# Patient Record
Sex: FEMALE | Race: WHITE | NOT HISPANIC OR LATINO | Employment: OTHER | URBAN - METROPOLITAN AREA
[De-identification: names, ages, dates, MRNs, and addresses within clinical notes are randomized per-mention and may not be internally consistent; named-entity substitution may affect disease eponyms.]

---

## 2019-02-08 ENCOUNTER — OFFICE VISIT (OUTPATIENT)
Dept: OBGYN CLINIC | Facility: CLINIC | Age: 74
End: 2019-02-08

## 2019-02-08 VITALS — HEIGHT: 61 IN | WEIGHT: 125.2 LBS | BODY MASS INDEX: 23.64 KG/M2

## 2019-02-08 DIAGNOSIS — N81.10 PELVIC ORGAN PROLAPSE QUANTIFICATION STAGE 1 CYSTOCELE: Primary | ICD-10-CM

## 2019-02-08 PROCEDURE — 99202 OFFICE O/P NEW SF 15 MIN: CPT | Performed by: NURSE PRACTITIONER

## 2019-02-08 RX ORDER — OMEPRAZOLE 20 MG/1
20 CAPSULE, DELAYED RELEASE ORAL
COMMUNITY
Start: 2012-09-12

## 2019-02-08 RX ORDER — FOLIC ACID 1 MG/1
1 TABLET ORAL
COMMUNITY
Start: 2012-09-12

## 2019-02-08 RX ORDER — LOSARTAN POTASSIUM 50 MG/1
50 TABLET ORAL
COMMUNITY
Start: 2018-10-16

## 2019-02-08 RX ORDER — SIMVASTATIN 40 MG
40 TABLET ORAL
COMMUNITY
Start: 2018-08-07

## 2019-02-08 RX ORDER — AMOXICILLIN 500 MG
1200 CAPSULE ORAL
COMMUNITY
Start: 2012-09-12

## 2019-02-08 RX ORDER — METOPROLOL SUCCINATE 100 MG/1
100 TABLET, EXTENDED RELEASE ORAL
COMMUNITY
Start: 2018-08-14

## 2019-02-08 RX ORDER — METHOTREXATE 2.5 MG/1
10 TABLET ORAL WEEKLY
COMMUNITY
Start: 2012-09-12

## 2019-02-08 NOTE — PROGRESS NOTES
Assessment/Plan:      Diagnoses and all orders for this visit:    Pelvic organ prolapse quantification stage 1 cystocele  -     Ambulatory referral to Urogynecology; Future    Other orders  -     Calcium-Vitamin D 500-125 MG-UNIT TABS; Take 1 tablet by mouth daily  -     folic acid (FOLVITE) 1 mg tablet; Take 1 mg by mouth  -     Omega-3 Fatty Acids (FISH OIL) 1200 MG CAPS; Take 1,200 mg by mouth  -     losartan (COZAAR) 50 mg tablet; Take 50 mg by mouth  -     metoprolol succinate (TOPROL-XL) 100 mg 24 hr tablet; Take 100 mg by mouth  -     methotrexate 2 5 MG tablet; Take 10 mg by mouth once a week  -     omeprazole (PRILOSEC) 20 mg delayed release capsule; Take 20 mg by mouth  -     simvastatin (ZOCOR) 40 mg tablet; Take 40 mg by mouth      -patient referred to [address] [place] [address],[address] for options for treatment  Patient verbalized understanding and is agreeable to be evaluated  Questions answered  Patient encouraged to remain well hydrated and to avoid constipation  Written information provided    RTO as needed     Subjective:     Patient ID: Adamaris James is a 68 y o  female  HPI  here with complaints of something falling out of vagina for the past 2-3 months  States it is worse with walking, doing chores  Will resolve when sitting down are resting  Denies history of abnormal Pap smears  Has never had abdominal surgery  Denies incontinence of urine or bowel  Denies difficulty urinating or having bowel movements  Denies vaginal discharge, odor or itching  Zjxmiswbh-kt-ec-date per patient    Review of Systems   Constitutional: Negative for chills and fever  Respiratory: Negative  Cardiovascular: Negative  Gastrointestinal: Negative  Genitourinary:        "Something in vagina"   Neurological: Negative for headaches  Objective:     Physical Exam   Constitutional: She is oriented to person, place, and time  She appears well-developed and well-nourished     Cardiovascular: Normal rate, regular rhythm and normal heart sounds  Pulmonary/Chest: Effort normal and breath sounds normal    Genitourinary: There is no rash, tenderness, lesion or injury on the right labia  There is no rash, tenderness, lesion or injury on the left labia  No erythema, tenderness or bleeding in the vagina  No foreign body in the vagina  No signs of injury around the vagina  No vaginal discharge found  Genitourinary Comments: Cervix normal   Small cystocele/small rectocele noted on exam   Vaginal tissue healthy, normal mucus  Neurological: She is alert and oriented to person, place, and time  Skin: Skin is warm and dry  Psychiatric: She has a normal mood and affect   Her behavior is normal

## 2019-02-08 NOTE — PATIENT INSTRUCTIONS
Rectocele   WHAT YOU NEED TO KNOW:   What is a rectocele? A rectocele, or vaginal hernia, is a bulge of the front wall of your rectum into your vagina  What causes a rectocele? A rectocele is often caused by weak muscles and ligaments that cannot hold and support the vagina and rectum  A wall of tough tissue, called the rectovaginal septum, separates the rectum from the vagina  The rectovaginal septum may be weak and thin  This weakening allows part of the rectum to push into the vagina  Weakening may be caused by the following:  · Aging:  Aging can cause the muscles to become weak  After menopause, a woman's body makes much less estrogen  Less estrogen makes pelvic muscles weaker  · Childbirth:  Pregnancy, and trauma during labor and delivery of a baby, may cause muscles around the vagina or rectum to weaken  Forceps used in the delivery, or tears into the rectum during delivery may cause the muscles to weaken  Large babies or multiple pregnancies may make your muscles weaker  · Genetics:  You may have been born with weaker muscles in your rectum and vagina  · Obesity:  More weight than what is suggested by your healthcare provider can put extra pressure on your muscles  · Straining:  The pressure inside your rectum increases when you strain  This usually happens with constipation, forceful coughing, and lifting heavy objects  · Surgery:  Past pelvic surgeries, such as a hysterectomy, may weaken the muscles around your vagina  What are the signs and symptoms of a rectocele? · A soft bulge of tissue in your vagina that may poke out through the vaginal opening     · Constipation    · Bowel movement that leaks out from your rectum     · Low back pain that goes away when you lie down     · Pain or pressure in your vagina when you urinate or have sex     · Pressure in your rectum, or you feel that your rectum is not empty after you have a bowel movement  How is a rectocele diagnosed?   Your healthcare provider will ask you about your lifestyle, past pregnancies, and any diseases you may have had  You may also need any of the following tests:  · Manual exam:  Your healthcare provider gently puts a warmed speculum into your vagina  A speculum is a tool that opens your vagina  Then he will ask you to strain or push down  This may cause a rectocele to bulge so he can check its size and location  You may need to tighten the muscles of your pelvis as if you are trying to stop urinating  This helps your healthcare provider learn the strength of your pelvic muscles  · Defecography:  A thick paste of barium is placed into your rectum through your anus  X-rays are taken while you push out the barium as if you are having a bowel movement  The barium makes an x-ray outline of your rectum and anus  This shows the changes taking place in your rectum and muscles during a bowel movement  · MRI:  This scan uses powerful magnets and a computer to take pictures of your abdomen and pelvis  The pictures may show problems in you rectum, vagina, or bladder  You may be given dye to help the pictures show up better  Tell the healthcare provider if you have ever had an allergic reaction to contrast dye  Do not enter the MRI room with anything metal  Metal can cause serious injury  Tell the healthcare provider if you have any metal in or on your body  · Pelvic floor fluoroscopy: This is an x-ray that shows the movement of your bowels, vagina, bladder, or rectum  Pictures of these body parts are taken and shown on a monitor  Healthcare providers may give you dye to help the pictures show up better on the screen  Tell the healthcare provider if you have ever had an allergic reaction to contrast dye  · Ultrasound: This test uses sound waves to show pictures on a monitor  An ultrasound may be done to show problems with your rectum, vagina, or bladder       · Anorectal manometry:  A flexible tube is inserted through your anus and into your rectum  Pressure from the muscles around your anus and rectum is measured by sensors in the tube  · Colonic transit studies: This test measures how fast things pass through your colon  You will be asked to swallow a large pill containing tiny plastic rings that serve as markers  These markers can be seen on x-rays  Pictures of your abdomen are taken for several days and the markers are counted  This helps your healthcare provider learn how long it takes for food to pass through your intestines  How is a rectocele treated? · Biofeedback therapy:  Biofeedback uses equipment to train you to control and relax your pelvic muscles during a bowel movement  Ask your healthcare provider for more information about biofeedback  · Estrogen:  Estrogen is a hormone that may be taken as a pill, or applied as a vaginal cream  Estrogen helps keep your pelvic muscles strong and may prevent your rectocele from getting worse  · Pessary:  A pessary is a plastic or rubber ring that is placed inside your vagina  This supports the bulging areas in your vagina and rectum  · Surgery:  Surgery may be needed to move the rectum back into place  This surgery fixes the weak walls of your vagina  Your rectum will move back into place once the walls of your vagina are fixed  What are the risks of a rectocele? A pessary may cause pain or infection  You may develop irregular bleeding or cancer if you use estrogen  Your rectocele may happen again, even with treatment  Your intestines may become blocked without treatment  How can I help prevent a rectocele? · Avoid pressure in your abdomen:  Do not strain when you have a bowel movement  Do not lift heavy objects  Tell your healthcare provider if you have a severe cough  · Do Kegel exercises regularly:  Squeeze your pelvic floor muscles to help them get stronger  Ask your healthcare provider for more information about Kegel exercises       · Drink liquids as directed:  Ask your healthcare provider how much liquid to drink each day and which liquids are best for you  Liquids will help decrease constipation  · Eat more fiber:  High-fiber foods, such as fresh fruits, vegetables, and whole grains, soften bowel movements  This helps bowel movements pass more quickly through your colon  Slowly add fiber into your diet to avoid bloating, stomach pain, and gas  · Maintain a healthy weight:  Ask your healthcare provider how much you should weigh  Ask him to help you create a weight loss plan if you are overweight  Ask for help planning an exercise program  Exercise helps your bowels work better and decreases pressure inside your colon  When should I contact my healthcare provider? · Your pain does not go away, even with treatment  · Your pessary falls out  · You have heavier vaginal bleeding than usual      · You are unable to have a bowel movement  · You have questions or concerns about your condition or care  When should I seek immediate care or call 911? · You have a mass hanging out of your vagina that you cannot push back into place  · You vomit several times in a row  · Your bowel movement is bright red or black  CARE AGREEMENT:   You have the right to help plan your care  Learn about your health condition and how it may be treated  Discuss treatment options with your caregivers to decide what care you want to receive  You always have the right to refuse treatment  The above information is an  only  It is not intended as medical advice for individual conditions or treatments  Talk to your doctor, nurse or pharmacist before following any medical regimen to see if it is safe and effective for you  © 2017 [employer] Information is for End User's use only and may not be sold, redistributed or otherwise used for commercial purposes   All illustrations and images included in CareNotes® are the copyrighted property of [employer]  or [employer]  Cystocele   WHAT YOU NEED TO KNOW:   What is a cystocele? A cystocele is a condition where part of your bladder falls into your vagina because of weakened or stretched pelvic muscles  In some cases your bladder may begin to slip through your vaginal opening  What increases my risk of a cystocele? A cystocele is commonly caused by weak pelvic muscles and ligaments that hold and support the bladder  Your risk of cystocele may be increased because of the following:  · Pregnancy or childbirth: This causes increased pressure and may stretch and weaken your pelvic muscles  You may have had trauma during childbirth from forceps or your baby's head  · Aging:  Aging and decreased hormones can cause your pelvic muscles to become weaker  · Obesity:  If you weigh more than your healthcare provider recommends, it may increase your risk of a cystocele  · Straining:  Constipation, severe coughing, or lifting heavy objects may cause increased pressure inside your abdomen and cause a cystocele  · Surgery:  Pelvic surgery such as a hysterectomy can increase your risk of a cystocele  · Collagen diseases: You may have a disease, such as Marfan's syndrome and Armond-Danlos syndrome, that makes your pelvic muscles weak  What are the signs and symptoms of a cystocele? · A soft bulge or lump in your vagina    · Low back pain that is relieved when you lie down    · Pelvic pain or pressure, especially when you urinate or have sex    · Pink or red urine    · Pressure in your abdomen, or you feel that you cannot completely empty your bladder    · Difficult, painful, or frequent urination, especially at night    · Urine leaks out when you cough, sneeze, or laugh  How is a cystocele diagnosed? Your healthcare provider will ask about your health history  This includes your lifestyle, past pregnancies, and any health conditions you have   You may need one or more of the following tests:  · Pelvic exam:  Your healthcare provider gently puts a speculum in your vagina to open it and asks you to strain or bear down  This may help find the location and size of your cystocele  Your healthcare provider may also ask you to tighten the muscles of your pelvis as if you are trying to stop urinating  This helps find how strong your pelvic muscles are  · Blood and urine test:  You may need blood or urine tests to check for an infection  · Ultrasound:  Sound waves are used to show pictures of the inside of your abdomen  A small wand with lotion on it is gently moved around your abdomen  The wand may also be placed in your vagina  Pictures of your bladder, vagina, rectum, or other pelvic organs are seen on a monitor  · X-ray: This test can take pictures of your kidneys, bladder, and ureters  You may be given dye to help the pictures show up better  Tell healthcare providers if you are allergic to iodine or shellfish  You may also be allergic to the dye  You may have x-rays taken while you urinate  · MRI:  This scan uses powerful magnets and a computer to take pictures of your bladder, vagina, and pelvic area  You may be given dye to help the pictures show up better  Tell healthcare providers if you are allergic to iodine or shellfish  You may also be allergic to the dye  Do not enter the MRI room with anything metal  Metal can cause serious injury  Tell healthcare providers if you have any metal in or on your body  · Urodynamics: This test checks if the muscles of your bladder are working properly  It also measures how much urine your bladder can hold  This test can also show whether your bladder fills and empties in a normal way  · Cystoscopy:  A thin tube with a scope is inserted into your urethra and up into your bladder  Your healthcare providers will look at the inside of your bladder for stones, bleeding, tumors, or signs of infection    How is a cystocele treated? Depending on your symptoms, you may need any of the following:  · Estrogen therapy:  Estrogen may help strengthen the pelvic muscles and keep your cystocele from getting worse  This may be taken as a pill, applied as a cream, or inserted in your vagina  · Pessary or tampon:  A pessary is a plastic or rubber ring and a tampon is a plug of cotton or other absorbent material  These are placed inside the vagina to support the bulging tissues in your bladder and vagina  · Surgery: You may need surgery to lift your bladder back into place  During surgery, stitches or a mesh patch may be placed between your bladder and vagina to hold your bladder in place  What are the risks of cystocele? You may have discomfort or an infection from using a pessary  You may have bleeding or an infection from surgery  If your cystocele is not treated, you may continue to have pain or difficulty urinating  If the cystocele comes out of your vaginal opening, it becomes more difficult to treat and control your symptoms  How can a cystocele be prevented? · Do Kegel exercises regularly: These exercises can help your pelvic floor muscles get stronger  Tighten the muscles of your pelvis (the muscles you use to stop urinating)  Hold the muscles tight for 5 seconds, then relax for 5 seconds  Gradually work up to hold the muscles contracted for 10 seconds  Do at least 3 sets of 10 repetitions a day  · Avoid straining:  Do not lift heavy objects, stand for long periods of time, or strain to have a bowel movement  Prevent constipation by drinking plenty of liquids and eating foods high in fiber  Ask how much liquid you should drink every day  High fiber foods include fresh fruits, vegetables, and whole grains  · Maintain a healthy weight:  Ask your healthcare provider if you are at a healthy weight and what is the best exercise program for you  Try to exercise at least 30 minutes every day   Exercise can also help prevent constipation  When should I contact my healthcare provider? · You have a fever  · You have chills or feel weak and achy  · You have lower abdominal pain or back pain that does not go away  · You cannot urinate  · You have questions or concerns about your condition or care  When should I seek immediate care? · You have abnormal bleeding from your vagina  · You have a mass coming out of your vagina that you cannot push back in     · You have severe lower abdominal pain  · You have a bad-smelling discharge coming from your vagina  CARE AGREEMENT:   You have the right to help plan your care  Learn about your health condition and how it may be treated  Discuss treatment options with your caregivers to decide what care you want to receive  You always have the right to refuse treatment  The above information is an  only  It is not intended as medical advice for individual conditions or treatments  Talk to your doctor, nurse or pharmacist before following any medical regimen to see if it is safe and effective for you  © 2017 [employer] Information is for End User's use only and may not be sold, redistributed or otherwise used for commercial purposes  All illustrations and images included in CareNotes® are the copyrighted property of A [employer] M [employer]  or [employer]

## 2019-04-18 ENCOUNTER — OFFICE VISIT (OUTPATIENT)
Dept: OBGYN CLINIC | Facility: CLINIC | Age: 74
End: 2019-04-18

## 2019-04-18 VITALS
HEART RATE: 71 BPM | BODY MASS INDEX: 23.69 KG/M2 | DIASTOLIC BLOOD PRESSURE: 79 MMHG | SYSTOLIC BLOOD PRESSURE: 213 MMHG | WEIGHT: 125.4 LBS

## 2019-04-18 DIAGNOSIS — N95.2 VAGINAL ATROPHY: Primary | ICD-10-CM

## 2019-04-18 PROCEDURE — 57160 INSERT PESSARY/OTHER DEVICE: CPT | Performed by: OBSTETRICS & GYNECOLOGY

## 2019-04-18 PROCEDURE — 99204 OFFICE O/P NEW MOD 45 MIN: CPT | Performed by: OBSTETRICS & GYNECOLOGY

## 2019-04-18 PROCEDURE — A4562 PESSARY, NON RUBBER,ANY TYPE: HCPCS | Performed by: OBSTETRICS & GYNECOLOGY

## 2019-04-18 RX ORDER — ESTRADIOL 0.1 MG/G
CREAM VAGINAL
Qty: 42.5 G | Refills: 0 | Status: SHIPPED | OUTPATIENT
Start: 2019-04-18 | End: 2021-11-05 | Stop reason: SDUPTHER

## 2019-05-09 ENCOUNTER — OFFICE VISIT (OUTPATIENT)
Dept: OBGYN CLINIC | Facility: CLINIC | Age: 74
End: 2019-05-09

## 2019-05-09 VITALS
WEIGHT: 123.8 LBS | HEART RATE: 69 BPM | DIASTOLIC BLOOD PRESSURE: 71 MMHG | SYSTOLIC BLOOD PRESSURE: 217 MMHG | BODY MASS INDEX: 23.39 KG/M2

## 2019-05-09 DIAGNOSIS — Z46.89 PESSARY MAINTENANCE: Primary | ICD-10-CM

## 2019-05-09 PROCEDURE — 99213 OFFICE O/P EST LOW 20 MIN: CPT | Performed by: OBSTETRICS & GYNECOLOGY

## 2019-12-12 ENCOUNTER — OFFICE VISIT (OUTPATIENT)
Dept: OBGYN CLINIC | Facility: CLINIC | Age: 74
End: 2019-12-12

## 2019-12-12 VITALS
DIASTOLIC BLOOD PRESSURE: 98 MMHG | WEIGHT: 128 LBS | BODY MASS INDEX: 24.19 KG/M2 | HEART RATE: 69 BPM | SYSTOLIC BLOOD PRESSURE: 208 MMHG

## 2019-12-12 DIAGNOSIS — N81.10 CYSTOCELE WITHOUT UTERINE PROLAPSE: Primary | ICD-10-CM

## 2019-12-12 PROCEDURE — 99213 OFFICE O/P EST LOW 20 MIN: CPT | Performed by: STUDENT IN AN ORGANIZED HEALTH CARE EDUCATION/TRAINING PROGRAM

## 2019-12-12 NOTE — PROGRESS NOTES
Urogynecology - Follow-up clinic note  [facility]   [id number]    Sami-speaking: no Phone  used: no    Chief complaint: "I'm here for my pessary change"    HPI: 76 y o  Hemanth Rose presents as follow-up for pessary care, she was last seen 5/9/19 in the [facility] for pessary insertion check  She has stage 2 anterior vaginal wall prolapse  She was advised to come every 3 months for pessary cleaning/exam and continue topical estrogen  Currently she has no concerns, she reports occasional YONI once or twice a month  No vaginal bleeding, no foul smell, no UTI, no pelvic pain  She is not sexually active  Of note, her blood pressures here in the office on 4 different checks with a manual cuff were noted to be extremely elevated 180-200s/79-90s  She was instructed to go the ED emergently for acute evaluation  She states this happens frequently when she visits the doctors and in March her BP was fine "130s/80s" and she did take her 2 blood pressure medications today  However, she states she will have her   her to the ED for further evaluation immediately after this appointment  She denies blurry vision, headache, and is asymptomatic  Risk of stroke, aneurysm and other related complications reviewed with patient      Prolapse symptoms  Bulge: no, when pessary is in  Pressure: no    Rubbing on clothing: no    Stenting for urine: no  Stenting for stool: no  Pessary use: yes Type: ring  Duration: 7 month(s)  Hormonal replacement therapy: not systemic, using vaginal premarin cream    Urinary symptoms  Urgency: no    Frequency: no   Incontinence with stress (exercise, valsalva, laugh, sneeze, cough): yes, ocasionally  Incontinence with urge: no  Postural incontinence: no   Nocturia:no   Dysuria: no   Hematuria:no   Incomplete emptying: no   Slow stream:no   Hesitancy: no   Straining with urination: no    Defecatory symptoms  Constipation:no   Frequency:no  0/day  Urgency: no   Fecal Incontinence: no  Gas incontinence:  no   Loose stools:  no   Bowel regimen:  no     Medical history and medication list reviewed and no significant changes since last visit  yes     Physical exam:  Abdomen: soft, non-tender, without masses or organomegaly  Pelvic: BUS normal  Introitus normal  Erythematous but well estrogenized appearing vaginal epithelium  Extremities: No calf tenderness  Neurologic:  alert, oriented, normal speech, no focal findings or movement disorder noted Clitoral-Bulbocavernosus reflex present    Vulvovaginal atrophy:  no none  Pessary examination: no erosion or ulceration of the vaginal walls, ring pessary removed, cleaned with water, lubricant applied and reinserted without difficulty  Urethral caruncle:  no none    Stage 2 anterior vaginal wall prolapse noted (stage 1 with pessary inserted)      Assessment/Plan:  76 y  o  with stage 2 anterior vaginal wall prolapse stable and well supported with ring pessary, no complications of use, continue to use estrogen cream as directed, RTC in 3 months for pessary check, pt advised to go emergently to the ED for treatment of her acute hypertensive crisis, reviewed Kegel exercises    Follow up in 3 month(s)     Juan Crenshaw DO

## 2019-12-12 NOTE — PATIENT INSTRUCTIONS
Please make sure to follow up with an appointment in 3 months to check your pessary  Please call with any questions/concerns

## 2020-03-12 ENCOUNTER — OFFICE VISIT (OUTPATIENT)
Dept: OBGYN CLINIC | Facility: CLINIC | Age: 75
End: 2020-03-12

## 2020-03-12 VITALS — DIASTOLIC BLOOD PRESSURE: 98 MMHG | SYSTOLIC BLOOD PRESSURE: 200 MMHG | BODY MASS INDEX: 23.81 KG/M2 | WEIGHT: 126 LBS

## 2020-03-12 DIAGNOSIS — N81.10 CYSTOCELE WITHOUT UTERINE PROLAPSE: Primary | ICD-10-CM

## 2020-03-12 PROCEDURE — 99213 OFFICE O/P EST LOW 20 MIN: CPT | Performed by: STUDENT IN AN ORGANIZED HEALTH CARE EDUCATION/TRAINING PROGRAM

## 2020-03-12 NOTE — PROGRESS NOTES
Urogynecology Evaluation  Elisa Smalls   [id number]    Icelandic-speaking: no Phone  used: no    Chief complaint: Pessary check    HPI: 76 y o  [id number] presents for pessary check  She had a size #2 ring pessary placed 4/18/2019 for stage 2 anterior vaginal wall prolapse and has been coming every 3 months for pessary care  She is doing well today and reports no complaints  She denies stress or urge urinary incontinence, denies dysuria or hematuria  No vaginal bleeding, no malodorous discharge  She is not currently sexually active  She is happy with her pessary and would like to continue use  She is continuing estrogen cream use 3x/week  Of note, her BP was 200/98 today  Patient reports white coat hypertension  States she checks her BP at home every day and BP is typically 120s-140s/60s-70s  She has good care with her family medicine doctor at [facility] and is currently on losartan 100mg, amlodipine 10mg, and metoprolol XL 100mg  She currently denies neurologic symptoms but is aware to go to ED for evaluation if she develops any concerning symptoms  Medical history and medication list reviewed     Physical exam:    Abdomen: soft, non-tender, without masses or organomegaly  Pelvic: BUS normal  Introitus normal  Normal appearing vaginal epithelium, small amount of atrophy  No cystocele or rectocele visualized  Neurologic:  alert, oriented, normal speech, no focal findings or movement disorder noted     Pessary examination: no erosion or ulceration of the vaginal walls, ring pessary removed, cleansed with water, and reinserted without difficulty  Assessment:  76 y  o  with stage 2 anterior vaginal wall prolapse, currently with pessary in place    Plan:    Pessary  No complications, continue pessary use  Continue estrogen cream 3x/week  RTC in 3 month for pessary care    Jun Mas MD  OBGYN, PGY-3  3/12/2020  4:53 PM

## 2020-06-11 ENCOUNTER — OFFICE VISIT (OUTPATIENT)
Dept: OBGYN CLINIC | Facility: CLINIC | Age: 75
End: 2020-06-11

## 2020-06-11 VITALS
TEMPERATURE: 97 F | SYSTOLIC BLOOD PRESSURE: 170 MMHG | HEART RATE: 75 BPM | BODY MASS INDEX: 24.64 KG/M2 | WEIGHT: 130.4 LBS | DIASTOLIC BLOOD PRESSURE: 67 MMHG

## 2020-06-11 DIAGNOSIS — Z46.89 PESSARY MAINTENANCE: Primary | ICD-10-CM

## 2020-06-11 PROCEDURE — 99213 OFFICE O/P EST LOW 20 MIN: CPT | Performed by: STUDENT IN AN ORGANIZED HEALTH CARE EDUCATION/TRAINING PROGRAM

## 2020-10-07 ENCOUNTER — TELEPHONE (OUTPATIENT)
Dept: OBGYN CLINIC | Facility: CLINIC | Age: 75
End: 2020-10-07

## 2020-10-08 ENCOUNTER — ROUTINE PRENATAL (OUTPATIENT)
Dept: OBGYN CLINIC | Facility: CLINIC | Age: 75
End: 2020-10-08

## 2020-10-08 VITALS — TEMPERATURE: 97.2 F | WEIGHT: 130 LBS | BODY MASS INDEX: 24.56 KG/M2 | HEART RATE: 88 BPM

## 2020-10-08 DIAGNOSIS — N81.4 CYSTOCELE WITH PROLAPSE: Primary | ICD-10-CM

## 2020-10-08 DIAGNOSIS — N81.10 CYSTOCELE WITHOUT UTERINE PROLAPSE: ICD-10-CM

## 2020-10-08 PROCEDURE — 99213 OFFICE O/P EST LOW 20 MIN: CPT | Performed by: STUDENT IN AN ORGANIZED HEALTH CARE EDUCATION/TRAINING PROGRAM

## 2020-10-09 ENCOUNTER — TELEPHONE (OUTPATIENT)
Dept: OBGYN CLINIC | Facility: CLINIC | Age: 75
End: 2020-10-09

## 2020-10-12 DIAGNOSIS — N95.2 VAGINAL ATROPHY: Primary | ICD-10-CM

## 2020-10-12 RX ORDER — ESTRADIOL 0.1 MG/G
1 CREAM VAGINAL 2 TIMES WEEKLY
Qty: 42.5 G | Refills: 1 | Status: SHIPPED | OUTPATIENT
Start: 2020-10-12 | End: 2021-11-05 | Stop reason: SDUPTHER

## 2021-02-25 ENCOUNTER — OFFICE VISIT (OUTPATIENT)
Dept: OBGYN CLINIC | Facility: CLINIC | Age: 76
End: 2021-02-25

## 2021-02-25 VITALS — WEIGHT: 124 LBS | SYSTOLIC BLOOD PRESSURE: 150 MMHG | BODY MASS INDEX: 23.43 KG/M2 | DIASTOLIC BLOOD PRESSURE: 87 MMHG

## 2021-02-25 DIAGNOSIS — N81.10 CYSTOCELE WITHOUT UTERINE PROLAPSE: Primary | ICD-10-CM

## 2021-02-25 PROCEDURE — 99213 OFFICE O/P EST LOW 20 MIN: CPT | Performed by: STUDENT IN AN ORGANIZED HEALTH CARE EDUCATION/TRAINING PROGRAM

## 2021-02-25 NOTE — PROGRESS NOTES
[facility] UroGynecology Progress note   [facility] 76 y o  female MRN: [id number]  Unit/Bed#:  Encounter: [id number]    Janie Mcdowell Reports to be feeling well  She reports her pessary is working well and denies discomfort  She denies vaginal bleeding, vaginal discharge or vaginal irritation  She denies symptoms of YONI or UUI  She wishes to continue using pessary  She would like to have clinic continued maintenance  She reports regular use of premarin cream 2x weekly at night  /87   Wt 56 2 kg (124 lb)   BMI 23 43 kg/m²       No results found for: WBC, HGB, HCT, MCV, PLT    Physical Exam  Gen: AAOx3, NAD  CVS: S1S2+, RRR, no murmurs  Lungs: CTA b/l normal respiratory effort and rate  Abdomen: soft, non tender  Pelvic:    Speculum exam: No areas of excoriation or irritation  No ulceration  No granulation tissue  No vaginal discharge  noted  Pessary removed and cleaned with soap and water  Ring pessary #2 re inserted without difficulty         A/P: 76 y o     1) Anterior vaginal wall prolapse (stage 2)    - Continue with routine pessary care   - Return to clinic in 4 months for pessary recheck   - Continue 2x weekly use of vaginal premarin cream    - Call for complications or problems with pessary dislodgement       Bob Mi DO  2021  2:05 PM

## 2021-06-24 ENCOUNTER — OFFICE VISIT (OUTPATIENT)
Dept: OBGYN CLINIC | Facility: CLINIC | Age: 76
End: 2021-06-24

## 2021-06-24 VITALS
BODY MASS INDEX: 23.24 KG/M2 | HEART RATE: 73 BPM | SYSTOLIC BLOOD PRESSURE: 177 MMHG | WEIGHT: 123 LBS | DIASTOLIC BLOOD PRESSURE: 68 MMHG

## 2021-06-24 DIAGNOSIS — N81.2 INCOMPLETE UTEROVAGINAL PROLAPSE: Primary | ICD-10-CM

## 2021-06-24 PROCEDURE — 99213 OFFICE O/P EST LOW 20 MIN: CPT | Performed by: STUDENT IN AN ORGANIZED HEALTH CARE EDUCATION/TRAINING PROGRAM

## 2021-06-26 NOTE — PROGRESS NOTES
[facility] Progress note   Meet Lei 76 y o  female MRN: [id number]  Unit/Bed#:  Encounter: [id number]    Ketty Mcdowell Reports to be feeling well  She reports her pessary is working well and denies discomfort  She denies vaginal bleeding, vaginal discharge or vaginal irritation  She denies symptoms of YONI or UUI  She wishes to continue using pessary  She would like to have clinic continued maintenance  She reports regular use of premarin cream 2x weekly at night  BP (!) 177/68   Pulse 73   Wt 55 8 kg (123 lb)   BMI 23 24 kg/m²       No results found for: WBC, HGB, HCT, MCV, PLT    Physical Exam  Gen: AAOx3, NAD  CVS: S1S2+, RRR, no murmurs  Lungs: CTA b/l normal respiratory effort and rate  Abdomen: soft, non tender  Pelvic:    Speculum exam: No areas of excoriation or irritation  No ulceration  No granulation tissue  No vaginal discharge  noted  Pessary removed and cleaned with soap and water  Ring pessary #2 re inserted without difficulty         A/P: 76 y o     1) Anterior vaginal wall prolapse (stage 2)    - Continue with routine pessary care   - Return to clinic in 4 months for pessary recheck   - Continue 2x weekly use of vaginal premarin cream    - Call for complications or problems with pessary dislodgement       Darron Muse DO  2021  1:13 PM

## 2021-11-05 ENCOUNTER — OFFICE VISIT (OUTPATIENT)
Dept: OBGYN CLINIC | Facility: CLINIC | Age: 76
End: 2021-11-05

## 2021-11-05 VITALS
DIASTOLIC BLOOD PRESSURE: 70 MMHG | SYSTOLIC BLOOD PRESSURE: 172 MMHG | HEART RATE: 68 BPM | WEIGHT: 125 LBS | BODY MASS INDEX: 23.62 KG/M2

## 2021-11-05 DIAGNOSIS — N81.10 CYSTOCELE WITHOUT UTERINE PROLAPSE: ICD-10-CM

## 2021-11-05 DIAGNOSIS — M35.3 POLYMYALGIA RHEUMATICA (HCC): Primary | ICD-10-CM

## 2021-11-05 DIAGNOSIS — N95.2 VAGINAL ATROPHY: ICD-10-CM

## 2021-11-05 PROCEDURE — 99213 OFFICE O/P EST LOW 20 MIN: CPT | Performed by: STUDENT IN AN ORGANIZED HEALTH CARE EDUCATION/TRAINING PROGRAM

## 2021-11-05 RX ORDER — ESTRADIOL 0.1 MG/G
CREAM VAGINAL
Qty: 42.5 G | Refills: 0 | Status: SHIPPED | OUTPATIENT
Start: 2021-11-05

## 2021-11-05 RX ORDER — ESTRADIOL 0.1 MG/G
1 CREAM VAGINAL 2 TIMES WEEKLY
Qty: 42.5 G | Refills: 1 | Status: SHIPPED | OUTPATIENT
Start: 2021-11-08

## 2022-03-04 ENCOUNTER — OFFICE VISIT (OUTPATIENT)
Dept: OBGYN CLINIC | Facility: CLINIC | Age: 77
End: 2022-03-04

## 2022-03-04 VITALS
DIASTOLIC BLOOD PRESSURE: 74 MMHG | HEART RATE: 68 BPM | WEIGHT: 123.4 LBS | BODY MASS INDEX: 23.32 KG/M2 | SYSTOLIC BLOOD PRESSURE: 157 MMHG

## 2022-03-04 DIAGNOSIS — N81.4 CYSTOCELE WITH PROLAPSE: ICD-10-CM

## 2022-03-04 DIAGNOSIS — Z46.89 PESSARY MAINTENANCE: Primary | ICD-10-CM

## 2022-03-04 DIAGNOSIS — N81.2 INCOMPLETE UTEROVAGINAL PROLAPSE: ICD-10-CM

## 2022-03-04 PROCEDURE — 99213 OFFICE O/P EST LOW 20 MIN: CPT | Performed by: STUDENT IN AN ORGANIZED HEALTH CARE EDUCATION/TRAINING PROGRAM

## 2022-03-04 RX ORDER — ATORVASTATIN CALCIUM 40 MG/1
1 TABLET, FILM COATED ORAL EVERY EVENING
COMMUNITY
Start: 2021-10-18

## 2022-03-04 RX ORDER — METOPROLOL TARTRATE 100 MG/1
TABLET ORAL
COMMUNITY

## 2022-03-04 RX ORDER — MAGNESIUM CARB/ALUMINUM HYDROX 105-160MG
TABLET,CHEWABLE ORAL
COMMUNITY

## 2022-03-04 NOTE — PROGRESS NOTES
Follow Up Visit  Pessary Check     Subjective:  Zoila Waters is a 68 y o   female who presents for pessary check  Denies changes since last office visit  Denies bleeding, irritation, problems with urination  No new meds  Using estrogen cream as prescribed  Objective:  Vitals:    22 1019   BP: 157/74   Pulse: 68     Physical Exam  Constitutional:       General: She is not in acute distress  Appearance: She is normal weight  She is not ill-appearing or diaphoretic  HENT:      Head: Normocephalic and atraumatic  Eyes:      Conjunctiva/sclera: Conjunctivae normal       Pupils: Pupils are equal, round, and reactive to light  Cardiovascular:      Rate and Rhythm: Normal rate  Pulmonary:      Effort: Pulmonary effort is normal  No respiratory distress  Abdominal:      General: Abdomen is flat  There is no distension  Palpations: Abdomen is soft  Tenderness: There is no abdominal tenderness  Genitourinary:     Comments: Normal appearing external genitalia, vaginal mucosa, and cervix  No evidence of vaginal discharge, drainage, bleeding, erosion/ulceration  Musculoskeletal:      Cervical back: Normal range of motion  Skin:     General: Skin is warm and dry  Neurological:      General: No focal deficit present  Mental Status: She is alert and oriented to person, place, and time  Mental status is at baseline  Psychiatric:         Mood and Affect: Mood normal          Behavior: Behavior normal          Thought Content: Thought content normal          Assessment:  Zoila Waters is a 68 y o   female who presents for pessary check  Pessary cleaned  Vaginal tissue healthy, no evidence of erosion/ulceration  Pessary replaced without difficulty       Plan:   - RTC 4 months for next pessary check, sooner PRN   - Continue vaginal estrogen     Sofiya Russell MD  PGY-3, OBGYN  22

## 2022-07-01 ENCOUNTER — OFFICE VISIT (OUTPATIENT)
Dept: OBGYN CLINIC | Facility: CLINIC | Age: 77
End: 2022-07-01

## 2022-07-01 VITALS
DIASTOLIC BLOOD PRESSURE: 68 MMHG | BODY MASS INDEX: 22.96 KG/M2 | HEIGHT: 61 IN | SYSTOLIC BLOOD PRESSURE: 169 MMHG | WEIGHT: 121.6 LBS | HEART RATE: 67 BPM

## 2022-07-01 DIAGNOSIS — N95.2 VAGINAL ATROPHY: ICD-10-CM

## 2022-07-01 DIAGNOSIS — N81.4 UTEROVAGINAL PROLAPSE: ICD-10-CM

## 2022-07-01 DIAGNOSIS — N76.5 VAGINAL ULCERATION: Primary | ICD-10-CM

## 2022-07-01 PROCEDURE — 99213 OFFICE O/P EST LOW 20 MIN: CPT | Performed by: OBSTETRICS & GYNECOLOGY

## 2022-07-01 NOTE — PROGRESS NOTES
[facility] Pessary Check   Violet Mclain 68 y o  female MRN: [id number]  Unit/Bed#:  Encounter: [id number]    Alma Mcdowell Reports to be feeling well  She reports her ring #2 pessary is working well and denies discomfort  She denies vaginal bleeding, vaginal discharge or vaginal irritation  She denies symptoms of YONI or UUI  She wishes to continue using pessary  She would like to have clinic continued maintenance  She reports regular use of premarin cream 2x weekly at night  Vitals:    07/01/22 1215   BP: 169/68   Pulse: 67       Physical Exam  Gen: AAOx3, NAD  Abdomen: soft, non tender  Pelvic:  Speculum exam: Pessary removed with minimal discharge  Medium speculum revealed 3 cm transverse x 1 cm deep superficial area of excoriation and granulation tissue on posterior fornix behind cervix  No bleeding  Pessary removed and cleaned with soap and water  Given back to patient in a bag  A/P: 68 y o    with pelvic organ prolapse here for pessary maintenance       1) Anterior vaginal wall prolapse (stage 2)    - Continue with routine pessary care   - Return to clinic in 2-4 weeks for pessary reinsertion pending healed vaginal wall ulcer   - Continue daily use of vaginal premarin cream    - Call for complications or problems with pessary dislodgement       Jodee Bowie MD  [facility] [address] [place] [address] for Female Pelvic Medicine

## 2022-08-12 ENCOUNTER — OFFICE VISIT (OUTPATIENT)
Dept: OBGYN CLINIC | Facility: CLINIC | Age: 77
End: 2022-08-12

## 2022-08-12 VITALS
SYSTOLIC BLOOD PRESSURE: 175 MMHG | BODY MASS INDEX: 22.67 KG/M2 | HEART RATE: 68 BPM | WEIGHT: 120 LBS | DIASTOLIC BLOOD PRESSURE: 65 MMHG

## 2022-08-12 DIAGNOSIS — N95.2 VAGINAL ATROPHY: ICD-10-CM

## 2022-08-12 DIAGNOSIS — N81.4 CYSTOCELE WITH PROLAPSE: Primary | ICD-10-CM

## 2022-08-12 PROCEDURE — 99213 OFFICE O/P EST LOW 20 MIN: CPT | Performed by: OBSTETRICS & GYNECOLOGY

## 2022-08-12 RX ORDER — CANDESARTAN 32 MG/1
32 TABLET ORAL DAILY
COMMUNITY
Start: 2022-01-03 | End: 2023-01-03

## 2022-08-12 RX ORDER — AMLODIPINE BESYLATE 10 MG/1
TABLET ORAL
COMMUNITY
Start: 2022-06-30

## 2022-08-12 RX ORDER — HYDROCHLOROTHIAZIDE 25 MG/1
25 TABLET ORAL DAILY
COMMUNITY
Start: 2022-04-19 | End: 2023-04-19

## 2022-08-12 NOTE — PROGRESS NOTES
[facility] Pessary Check   Rene Hirsch 68 y o  female MRN: [id number]  Unit/Bed#:  Encounter: [id number]    Chilango Mcdowell has no complaints today  She denies any vaginal bleeding or pain  She has had some increased urinary frequency without the pessary in place  She reports no changes in her health, although she does have elevated BP today  She recently saw her PCP at [facility] for her hypertension and she is now on 4 medications: HCTZ, Amlodipine, Topril and Candesartan  She uses a ring #2 pessary that has been out since 7/1/22 to allow for healing of a vaginal ulcer  She reports using Premarin daily  Vitals:    08/12/22 0958   BP: (!) 175/65   Pulse: 68       Physical Exam  Gen: AAOx3, NAD  Abdomen: soft, non tender  Pelvic:  Speculum exam: Pessary removed with minimal discharge  Superficial vaginal ulcer in the posterior fornix, 3 cm transverse x 0 5 cm but much improved from prior exam, no bleeding  Pessary replaced as patient given options and she strongly desired placement  A/P: 68 y o    with pelvic organ prolapse here for pessary maintenance  Anterior vaginal wall prolapse (stage 2)    - Continue with routine pessary care   - Pessary replaced today      - Sent vaginal premarin cream refills and instructed the patient to space out usage to three times per week    - Call for complications or problems with pessary dislodgement     Izabela Bailon MD  [facility] [address] [place] [address] for Female Pelvic Medicine

## 2022-10-11 ENCOUNTER — OFFICE VISIT (OUTPATIENT)
Dept: OBGYN CLINIC | Facility: CLINIC | Age: 77
End: 2022-10-11

## 2022-10-11 VITALS
DIASTOLIC BLOOD PRESSURE: 65 MMHG | SYSTOLIC BLOOD PRESSURE: 173 MMHG | HEART RATE: 66 BPM | WEIGHT: 121 LBS | BODY MASS INDEX: 22.86 KG/M2

## 2022-10-11 DIAGNOSIS — N81.10 CYSTOCELE WITHOUT UTERINE PROLAPSE: Primary | ICD-10-CM

## 2022-10-11 DIAGNOSIS — N95.2 VAGINAL ATROPHY: ICD-10-CM

## 2022-10-11 PROCEDURE — 99213 OFFICE O/P EST LOW 20 MIN: CPT | Performed by: OBSTETRICS & GYNECOLOGY

## 2022-10-11 NOTE — PROGRESS NOTES
UroGynecology Pessary Check   Meagan Maguire 68 y o  female MRN: [id number]    Assessment:    Diagnoses and all orders for this visit:    Cystocele without uterine prolapse    Vaginal atrophy    - Pessary cleansed and replaced without difficulty  Plan:   Return to office in 3 months, or sooner PRN  Subjective     Meagan Maguire is a 68 y o  female with anterior vaginal wall/pelvic organ prolapse here for a pessary maintenance visit  Current pessary: ring; Size 2     She reports feeling well today  After her last change, her pessary fell out when she went home  She was able to replace it without difficulty or pain, but was worried at the time  Discussed utilizing sterile gel to aid in replacement if it happens again, and said that her strategy was successful, as she did not have pain, bleeding or prolapse since her own replacement  Patient does not have any other complaints  Vaginal bleeding: none  Bladder complaints: no problems with bladder  Leukorrhea: none  Pelvic Pain/Discomfort: none    No dysuria, hematuria, constipation, diarrhea, blood in stool    At her last assessment, she was noted to have a healing vaginal ulcer in the posterior fornix, 3 cm transverse x 0 5cm        Patient Active Problem List   Diagnosis   • Cystocele without uterine prolapse   • Polymyalgia rheumatica ([facility] [address] )   • Vaginal atrophy       Obstetric History  OB History    Para Term  AB Living   3 3 3     3   SAB IAB Ectopic Multiple Live Births                  # Outcome Date GA Lbr Gustavo/2nd Weight Sex Delivery Anes PTL Lv   3 Term            2 Term            1 Term                Past Medical/Surgical/Family/Social History  The following portions of the patient's history were reviewed and updated as appropriate: allergies, current medications, past family history, past medical history, past social history, past surgical history and problem list       Oxycodone-acetaminophen    Current Outpatient Medications: •  amLODIPine (NORVASC) 10 mg tablet, , Disp: , Rfl:   •  atorvastatin (LIPITOR) 40 mg tablet, Take 1 tablet by mouth every evening, Disp: , Rfl:   •  Calcium-Vitamin D 500-125 MG-UNIT TABS, Take 1 tablet by mouth daily, Disp: , Rfl:   •  candesartan (ATACAND) 32 MG tablet, Take 32 mg by mouth daily, Disp: , Rfl:   •  conjugated estrogens (PREMARIN) vaginal cream, Insert 0 5 g into the vagina 3 (three) times a week, Disp: 30 g, Rfl: 3  •  estradiol (ESTRACE) 0 1 mg/g vaginal cream, Insert 1 g into the vagina 2 (two) times a week, Disp: 42 5 g, Rfl: 1  •  folic acid (FOLVITE) 1 mg tablet, Take 1 mg by mouth, Disp: , Rfl:   •  Glucosamine-Chondroitin 750-600 MG TABS, Glucosamine-Chondroitin TABS   Refills: 0     Active, Disp: , Rfl:   •  hydrochlorothiazide (HYDRODIURIL) 25 mg tablet, Take 25 mg by mouth daily, Disp: , Rfl:   •  losartan (COZAAR) 50 mg tablet, Take 50 mg by mouth, Disp: , Rfl:   •  methotrexate 2 5 MG tablet, Take 10 mg by mouth once a week, Disp: , Rfl:   •  metoprolol succinate (TOPROL-XL) 100 mg 24 hr tablet, Take 100 mg by mouth, Disp: , Rfl:   •  metoprolol tartrate (LOPRESSOR) 100 mg tablet, TAKE TABLET Once, Disp: , Rfl:   •  Multiple Vitamins-Minerals (MULTIVITAL PO), Multivitamins Oral Capsule   Refills: 0     Active, Disp: , Rfl:   •  Omega-3 Fatty Acids (FISH OIL) 1200 MG CAPS, Take 1,200 mg by mouth, Disp: , Rfl:   •  omeprazole (PriLOSEC) 20 mg delayed release capsule, Take 20 mg by mouth, Disp: , Rfl:   •  simvastatin (ZOCOR) 40 mg tablet, Take 40 mg by mouth, Disp: , Rfl:       Review of Systems  Constitutional: Negative for fevers, chills, headaches, vision changes  Respiratory: Negative for shortness of breath, cough  Cardiovascular: Negative for chest pain, palpitations  Gastrointestinal: Negative for nausea, vomiting, diarrhea, constipation  :  Negative for dysuria, hematuria  EXTR:  Negative for rash       Objective     BP (!) 173/65   Pulse 66   Wt 54 9 kg (121 lb)   BMI 22 86 kg/m²     Physical Exam  Gen: Alert, pleasant  Abdomen: soft, non tender  Pelvic:  Speculum exam: Pessary removed with scant discharge  Small area of erythema noted at 3 o'clock (3mm), distal from cervix  No defined borders, no bleeding  Previously seen superficial vaginal ulcer in posterior fornix now resolved      Current Pessary Type: ring  Number: 2    Washed with chlorhexidine, betadine, and soap with water, then replaced        Ken Sams, PGY3

## 2023-01-10 ENCOUNTER — OFFICE VISIT (OUTPATIENT)
Dept: OBGYN CLINIC | Facility: CLINIC | Age: 78
End: 2023-01-10

## 2023-01-10 VITALS — BODY MASS INDEX: 22.52 KG/M2 | DIASTOLIC BLOOD PRESSURE: 80 MMHG | SYSTOLIC BLOOD PRESSURE: 160 MMHG | WEIGHT: 119.2 LBS

## 2023-01-10 DIAGNOSIS — N81.10 CYSTOCELE WITHOUT UTERINE PROLAPSE: Primary | ICD-10-CM

## 2023-01-12 NOTE — PROGRESS NOTES
Pessary    Date/Time: 1/10/2023 9:40 AM  Performed by: Jian Mi MD  Authorized by: Jian Mi MD   Universal Protocol:  Consent: Verbal consent obtained  Written consent obtained  Risks and benefits: risks, benefits and alternatives were discussed  Consent given by: patient  Time out: Immediately prior to procedure a "time out" was called to verify the correct patient, procedure, equipment, support staff and site/side marked as required  Patient understanding: patient states understanding of the procedure being performed  Patient consent: the patient's understanding of the procedure matches consent given  Patient identity confirmed: verbally with patient      Indication:     Indication for pessary: uterine prolapse and cystocele    Pre-procedure:     Pessary procedure type:  Cleaning/check  Problems:     Pessary complications: none    Procedure:     Pessary type:  Ring w/ support    Patient tolerance of procedure:   Tolerated well, no immediate complications  Comments:     Procedure comments:  Vagina inspected and found to be intact   No sign of erosion or infection     Went over options for surgery   She will consider it in the future   Her daughter is also thinking about surgery

## 2023-05-18 ENCOUNTER — OFFICE VISIT (OUTPATIENT)
Dept: OBGYN CLINIC | Facility: CLINIC | Age: 78
End: 2023-05-18

## 2023-05-18 VITALS
WEIGHT: 119.6 LBS | SYSTOLIC BLOOD PRESSURE: 164 MMHG | HEIGHT: 62 IN | HEART RATE: 70 BPM | BODY MASS INDEX: 22.01 KG/M2 | DIASTOLIC BLOOD PRESSURE: 73 MMHG

## 2023-05-18 DIAGNOSIS — M35.3 POLYMYALGIA RHEUMATICA (HCC): ICD-10-CM

## 2023-05-18 DIAGNOSIS — N81.4 UTERINE PROLAPSE: Primary | ICD-10-CM

## 2023-05-18 NOTE — PROGRESS NOTES
"[address] [place] [address]  [place]  [address] [address]  Phone#  [phone]  Fax#  [phone]  Bianca Solis       PROBLEM VISIT      Subjective     Bianca Solis is a 68 y o  female here for pessary maintanence    Patient with current history of cystocele with uterine prolapse stage 2 in management with a ring pessary size 2 in place, last visit for cleaning was last 1/10/23  Patient is currently using estrogen cream 3 x per week    Personal health questionnaire reviewed: yes  Obstetric History  OB History    Para Term  AB Living   3 3 3     3   SAB IAB Ectopic Multiple Live Births                  # Outcome Date GA Lbr Gustavo/2nd Weight Sex Delivery Anes PTL Lv   3 Term            2 Term            1 Term                  The following portions of the patient's history were reviewed and updated as appropriate: allergies, current medications, past medical history, past social history and problem list     Review of Systems  Pertinent items are noted in HPI        Objective     /73   Pulse 70   Ht 5' 1 5\" (1 562 m)   Wt 54 3 kg (119 lb 9 6 oz)   BMI 22 23 kg/m²     General Appearance:    Alert, cooperative, no distress, appears stated age   Head:    Normocephalic, without obvious abnormality, atraumatic   Eyes:    PERRL, conjunctiva/corneas clear, EOM's intact, fundi     benign, both eyes   Ears:    Normal TM's and external ear canals, both ears   Nose:   Nares normal, septum midline, mucosa normal, no drainage    or sinus tenderness   Throat:   Lips, mucosa, and tongue normal; teeth and gums normal   Neck:   Supple, symmetrical, trachea midline, no adenopathy;     thyroid:  no enlargement/tenderness/nodules; no carotid    bruit or JVD   Back:     Symmetric, no curvature, ROM normal, no CVA tenderness   Lungs:     Clear to auscultation bilaterally, respirations unlabored   Chest Wall:    No tenderness or deformity    Heart:    Regular rate and rhythm, S1 " and S2 normal, no murmur, rub   or gallop   Breast Exam:    No tenderness, masses, or nipple abnormality   Abdomen:     Soft, non-tender, bowel sounds active all four quadrants,     no masses, no organomegaly   Genitalia:    Ring pessary size 2 in place, it was removed without complication, speculum examination with 2 cm area of abration/laceration in the left vaginal mucosa  No active bleeding present   Rectal:    Normal tone, no masses or tenderness; guaiac negative stool   Extremities:   Extremities normal, atraumatic, no cyanosis or edema   Pulses:   2+ and symmetric all extremities   Skin:   Skin color, texture, turgor normal, no rashes or lesions   Lymph nodes:   Cervical, supraclavicular, and axillary nodes normal   Neurologic:   CNII-XII intact, normal strength, sensation and reflexes     throughout         Assessment/Plan    Cystocele with uterine prolapse stage 2   · In management with a ring pessary size 2 in place  · Last visit for cleaning was last 1/10/23  Pessary maintenance without complications  · Patient is currently using estrogen cream 3 x per week  · Speculum examination with 2 cm area of abration/laceration in the left vaginal mucosa  No active bleeding present  · Pessary An discussed with patient, pessary was cleaned and handed to patient , follow up in 2 months to review mucosa healing discussed  Patient instructed to bring her pessary with her at that time  · Patient is interested in surgical definitive management, patient has been referred to Urogyn Dr Rafaela Kennedy    · All questions answered to her satisfaction      D/w Dr Sebastian Lozoya MD

## 2023-07-18 ENCOUNTER — OFFICE VISIT (OUTPATIENT)
Dept: OBGYN CLINIC | Facility: CLINIC | Age: 78
End: 2023-07-18

## 2023-07-18 VITALS
BODY MASS INDEX: 21.37 KG/M2 | WEIGHT: 113.2 LBS | HEART RATE: 69 BPM | HEIGHT: 61 IN | DIASTOLIC BLOOD PRESSURE: 67 MMHG | SYSTOLIC BLOOD PRESSURE: 139 MMHG

## 2023-07-18 DIAGNOSIS — N81.10 CYSTOCELE WITHOUT UTERINE PROLAPSE: ICD-10-CM

## 2023-07-18 DIAGNOSIS — Z46.89 PESSARY MAINTENANCE: Primary | ICD-10-CM

## 2023-07-18 PROCEDURE — 99213 OFFICE O/P EST LOW 20 MIN: CPT | Performed by: OBSTETRICS & GYNECOLOGY

## 2023-07-18 NOTE — PROGRESS NOTES
Assessment/Plan:     No problem-specific Assessment & Plan notes found for this encounter. Diagnoses and all orders for this visit:    Pessary maintenance    Cystocele without uterine prolapse    Can keep pessary in place. No need to return here as she plans on surgery with Dr. Abe Dominguez. Subjective:      Patient ID: Roni Guzman is a 68 y.o. female who presents for pessary maintenance. She has a # 2 ring with support from which she was taking a holiday from due to vaginal ulceration. She is doing well today but happy to have the pessary replaced. She plans on definitive surgery and has an appt with Dr. Abe Dominguez in September. HPI    The following portions of the patient's history were reviewed and updated as appropriate: allergies, current medications, past family history, past medical history, past social history, past surgical history and problem list.    Review of Systems      Objective:      /67   Pulse 69   Ht 5' 1" (1.549 m)   Wt 51.3 kg (113 lb 3.2 oz)   BMI 21.39 kg/m²          Physical Exam  Vitals and nursing note reviewed. Exam conducted with a chaperone present. Constitutional:       Appearance: Normal appearance. Pulmonary:      Effort: Pulmonary effort is normal.   Genitourinary:     Labia:         Right: No rash, tenderness, lesion or injury. Left: No rash, tenderness, lesion or injury. Comments: Atrophic vagina  Stage 2 cystocele  Neurological:      General: No focal deficit present. Mental Status: She is alert and oriented to person, place, and time.    Psychiatric:         Mood and Affect: Mood normal.         Behavior: Behavior normal.

## 2023-11-01 ENCOUNTER — ANESTHESIA EVENT (OUTPATIENT)
Dept: PERIOP | Facility: HOSPITAL | Age: 78
End: 2023-11-01
Payer: COMMERCIAL

## 2023-11-07 NOTE — DISCHARGE INSTRUCTIONS
Apical Vaginal Repair (EnPlace System)    WHAT YOU NEED TO KNOW:   An apical vaginal repair is a procedure to lift the cervix, vagina  and surrounding structures to the normal anatomical position. This can help prevent you from having a bulge sensation in the vagina. The procedure is also called an EnPlace procedure. **** VAGINAL PACKING WAS PLACED IN THE VAGINA- This packing will need to be removed at home on POD #3 ****      DISCHARGE INSTRUCTIONS:   Medicines:   Pain medicine: You may need medicine to take away or decrease pain. Learn how to take your medicine. Ask what medicine and how much you should take. Be sure you know how, when, and how often to take it. Do not wait until the pain is severe before you take your medicine. Tell caregivers if your pain does not decrease. Pain medicine can make you dizzy or sleepy. Prevent falls by calling someone when you get out of bed or if you need help. Antibiotics: This medicine is given to fight or prevent an infection caused by bacteria. Always take your antibiotics exactly as ordered by your healthcare provider. Do not stop taking your medicine unless directed by your healthcare provider. Never save antibiotics or take leftover antibiotics that were given to you for another illness. Take your medicine as directed. Contact your healthcare provider if you think your medicine is not helping or if you have side effects. Tell him or her if you are allergic to any medicine. Keep a list of the medicines, vitamins, and herbs you take. Include the amounts, and when and why you take them. Bring the list or the pill bottles to follow-up visits. Carry your medicine list with you in case of an emergency. Follow up with your healthcare provider as directed:  Write down your questions so you remember to ask them during your visits. Self-care:   Vaginal packing: Vaginal packing was placed into the vagina.  You will remove this packing on POD #3 early in the morning. (This is the 3rd day after your surgery). The office will call to remind you to remove this packing. Sex:  Do not have sex until your healthcare provider says it is okay. Kegel exercises: To do kegel exercises, squeeze your pelvic floor muscles for 5 to 10 seconds, then release. Regular kegel exercises will help your pelvic floor muscles become stronger. This will help prevent you from leaking urine. Ask your healthcare provider when to start these exercises and how often to do them. Sanitary pad:  Change your sanitary pad regularly. Keep track of how often you change the pad. Hadley catheter:  Keep the bag below your waist. This will help prevent infection and other problems caused by urine flowing back into your bladder. Do not pull on the catheter because this can cause pain and bleeding, and the catheter could come out. Keep the catheter tubing free of kinks so your urine will flow into the bag. Your healthcare provider will remove the catheter as soon as possible, to help prevent infection. Wound care:  When you are allowed to bathe or shower, carefully wash your vaginal area with soap and water. Do not put pressure on your abdomen: This will help prevent damage to your surgery area. Do not strain, lift heavy objects, or stand for a very long time. Do not perform strenuous exercises, such as running and weight lifting. Activity:  You may need to start walking within a few days after your procedure. Ask your healthcare provider when to start and how long you should walk. Ask about any other exercises that may be right for you. Support socks: You may need to wear support socks. These are tight socks that help increase the circulation in your legs until you are more active. This helps prevent blood clots. Contact your healthcare provider if:   You soak a sanitary pad with blood every hour for 4 hours.     You have vaginal pain that does not go away even after you take pain medicine. You have pus or a foul-smelling discharge from your genital area. You see blood in your urine. You have pain during sex. You have a fever, chills, a cough, or feel weak and achy. You have nausea and vomiting. You have questions or concerns about your condition or care. Seek care immediately or call 911 if: You feel something is bulging out into your vagina or rectum and not going back in. You cannot urinate. Your arm or leg feels warm, tender, and painful. It may look swollen and red. You suddenly feel lightheaded and short of breath. You have chest pain. You may have more pain when you take a deep breath or cough. You may cough up blood. © 2017 [facility] Information is for End User's use only and may not be sold, redistributed or otherwise used for commercial purposes. All illustrations and images included in CareNotes® are the copyrighted property of [employer] or [employer]. The above information is an  only. It is not intended as medical advice for individual conditions or treatments. Talk to your doctor, nurse or pharmacist before following any medical regimen to see if it is safe and effective for you. Urethral Sling Procedure   WHAT YOU NEED TO KNOW:   A bladder sling procedure is surgery to treat urinary incontinence in women. The sling acts as a hammock to keep your urethra in place and hold it closed when your bladder is full. You may have vaginal bleeding or discharge for up to a week after your surgery. Use sanitary pads. Do not use tampons. You may have some pelvic discomfort or trouble urinating. DISCHARGE INSTRUCTIONS:   Call 911 for any of the following: You have sudden trouble breathing. Seek care immediately if:   Your bleeding gets worse. You have yellow or foul smelling discharge from your vagina. You cannot urinate, or you are urinating less than what is normal for you.     You feel confused. Contact your healthcare provider if:   You have a fever. You do not feel like you are able to empty your bladder completely when you urinate. You feel the need to urinate very suddenly. You have burning or stinging when you urinate. You have blood in your urine. Your skin is itchy, swollen, or you have a rash. You have questions or concerns about your condition or care. Medicines:   Prescription pain medicine  may be given. Ask your how to take this medicine safely. Take your medicine as directed. Contact your healthcare provider if you think your medicine is not helping or if you have side effects. Tell him or her if you are allergic to any medicine. Keep a list of the medicines, vitamins, and herbs you take. Include the amounts, and when and why you take them. Bring the list or the pill bottles to follow-up visits. Carry your medicine list with you in case of an emergency. Self-catheterization:  You may need to put a catheter into your bladder after you urinate to empty any remaining urine. A catheter is a small rubber tube used to drain urine. Healthcare providers will teach you how to put the catheter in safely. This may be needed until you are completely emptying your bladder. Hadley catheter: You may have a Hadley catheter for a short period of time. The Hadley is a tube put into your bladder to drain urine into a bag. Keep the bag below your waist. This will prevent urine from flowing back into your bladder and causing an infection or other problems. Also, keep the tube free of kinks so the urine will drain properly. Do not pull on the catheter. This can cause pain and bleeding, and may cause the catheter to come out. Activity:  Do not lift heavy objects for 6 weeks after your procedure. Do not have intercourse for 4 to 6 weeks. Do not use a tampon for 4 weeks. Ask your healthcare provider when you can return to work or your usual activities.   Do pelvic muscle exercises: These are also called Kegel exercises. These exercises help strengthen your pelvic muscles and help prevent urine leakage. Tighten the muscles of your pelvis and hold them tight for 5 seconds, then relax for 5 seconds. Gradually work up to tightening them for 10 seconds and relaxing for 10 seconds. Do this 3 times each day. Keep a record:  Keep a record of when you urinate and if you leak any urine. Write down what you were doing when you leaked urine, such as coughing or sneezing. Bring the log to your follow-up visits. Prevent constipation:  Drink liquids as directed. You may need to drink more water than usual to soften your bowel movements. Eat a variety of healthy foods, especially fruit and foods high in fiber. You may need to use an over-the-counter bowel movement softener. Follow up with your healthcare provider as directed: You may need a test to check how much urine remains in your bladder after you urinate. This will help show how the sling is working. Write down your questions so you remember to ask them during your visits. © 2017 [address] [place] [address] [employer] Information is for End User's use only and may not be sold, redistributed or otherwise used for commercial purposes. All illustrations and images included in CareNotes® are the copyrighted property of [employer] or [employer]. The above information is an  only. It is not intended as medical advice for individual conditions or treatments. Talk to your doctor, nurse or pharmacist before following any medical regimen to see if it is safe and effective for you.

## 2023-11-09 NOTE — PRE-PROCEDURE INSTRUCTIONS
Pre-Surgery Instructions:   Medication Instructions    amLODIPine (NORVASC) 10 mg tablet Take day of surgery. atorvastatin (LIPITOR) 40 mg tablet Take night before surgery    Calcium-Vitamin D 500-125 MG-UNIT TABS Stop taking 7 days prior to surgery. candesartan (ATACAND) 32 MG tablet Hold day of surgery. conjugated estrogens (PREMARIN) vaginal cream Stop taking 7 days prior to surgery. Per Dr. Erasmo Quintana    folic acid (FOLVITE) 1 mg tablet Stop taking 7 days prior to surgery. Glucosamine-Chondroitin 750-600 MG TABS Stop taking 7 days prior to surgery. hydrochlorothiazide (HYDRODIURIL) 25 mg tablet Hold day of surgery. methotrexate 2.5 MG tablet Takes weekly on Tuesdays- will take as prescribed    metoprolol succinate (TOPROL-XL) 100 mg 24 hr tablet Take day of surgery. Multiple Vitamins-Minerals (MULTIVITAL PO) Stop taking 7 days prior to surgery. Omega-3 Fatty Acids (FISH OIL) 1200 MG CAPS Stop taking 7 days prior to surgery. Medication instructions for day surgery reviewed. Please use only a sip of water to take your instructed medications. Avoid all over the counter vitamins, supplements and NSAIDS for one week prior to surgery per anesthesia guidelines. Tylenol is ok to take as needed. You will receive a call one business day prior to surgery with an arrival time and hospital directions. If your surgery is scheduled on a Monday, the hospital will be calling you on the Friday prior to your surgery. If you have not heard from anyone by 8pm, please call the hospital supervisor through the hospital  at [phone]. Laura Martinez [phone]). Do not eat or drink anything after midnight the night before your surgery, including candy, mints, lifesavers, or chewing gum. Do not drink alcohol 24hrs before your surgery. Try not to smoke at least 24hrs before your surgery.        Follow the pre surgery showering instructions as listed in the [facility] Surgical Experience Booklet” or otherwise provided by your surgeon's office. Do not use a blade to shave the surgical area 1 week before surgery. It is okay to use a clean electric clippers up to 24 hours before surgery. Do not apply any lotions, creams, including makeup, cologne, deodorant, or perfumes after showering on the day of your surgery. Do not use dry shampoo, hair spray, hair gel, or any type of hair products. No contact lenses, eye make-up, or artificial eyelashes. Remove nail polish, including gel polish, and any artificial, gel, or acrylic nails if possible. Remove all jewelry including rings and body piercing jewelry. Wear causal clothing that is easy to take on and off. Consider your type of surgery. Keep any valuables, jewelry, piercings at home. Please bring any specially ordered equipment (sling, braces) if indicated. Arrange for a responsible person to drive you to and from the hospital on the day of your surgery. Visitor Guidelines discussed. Call the surgeon's office with any new illnesses, exposures, or additional questions prior to surgery. Please reference your [facility] Surgical Experience Booklet” for additional information to prepare for your upcoming surgery.

## 2023-11-13 ENCOUNTER — ANESTHESIA (OUTPATIENT)
Dept: PERIOP | Facility: HOSPITAL | Age: 78
End: 2023-11-13
Payer: COMMERCIAL

## 2023-11-13 ENCOUNTER — HOSPITAL ENCOUNTER (OUTPATIENT)
Facility: HOSPITAL | Age: 78
Setting detail: OUTPATIENT SURGERY
Discharge: HOME | End: 2023-11-13
Attending: OBSTETRICS & GYNECOLOGY | Admitting: OBSTETRICS & GYNECOLOGY
Payer: COMMERCIAL

## 2023-11-13 VITALS
OXYGEN SATURATION: 94 % | TEMPERATURE: 96.8 F | DIASTOLIC BLOOD PRESSURE: 69 MMHG | HEART RATE: 67 BPM | BODY MASS INDEX: 20.95 KG/M2 | WEIGHT: 110.89 LBS | RESPIRATION RATE: 16 BRPM | SYSTOLIC BLOOD PRESSURE: 156 MMHG

## 2023-11-13 PROBLEM — Z97.2 WEARS PARTIAL DENTURES: Status: ACTIVE | Noted: 2023-11-13

## 2023-11-13 PROBLEM — I10 HYPERTENSION: Status: ACTIVE | Noted: 2023-11-13

## 2023-11-13 PROBLEM — N81.11 CYSTOCELE, MIDLINE: Status: ACTIVE | Noted: 2019-12-12

## 2023-11-13 PROBLEM — N36.41 HYPERMOBILITY OF URETHRA: Status: ACTIVE | Noted: 2023-11-13

## 2023-11-13 PROBLEM — N81.89 OTHER FEMALE GENITAL PROLAPSE: Status: ACTIVE | Noted: 2023-11-13

## 2023-11-13 PROBLEM — N81.6 RECTOCELE: Status: ACTIVE | Noted: 2023-11-13

## 2023-11-13 PROBLEM — E78.5 HYPERLIPIDEMIA: Status: ACTIVE | Noted: 2023-11-13

## 2023-11-13 PROBLEM — N81.2 INCOMPLETE UTEROVAGINAL PROLAPSE: Status: ACTIVE | Noted: 2023-11-13

## 2023-11-13 PROCEDURE — 51798 US URINE CAPACITY MEASURE: CPT | Performed by: OBSTETRICS & GYNECOLOGY

## 2023-11-13 PROCEDURE — C1771 REP DEV, URINARY, W/SLING: HCPCS | Performed by: OBSTETRICS & GYNECOLOGY

## 2023-11-13 DEVICE — SINGLE INCISION SLING SYSTEM
Type: IMPLANTABLE DEVICE | Site: VAGINA | Status: FUNCTIONAL
Brand: ALTIS

## 2023-11-13 RX ORDER — FENTANYL CITRATE/PF 50 MCG/ML
25 SYRINGE (ML) INJECTION
Status: DISCONTINUED | OUTPATIENT
Start: 2023-11-13 | End: 2023-11-13 | Stop reason: HOSPADM

## 2023-11-13 RX ORDER — KETAMINE HCL IN NACL, ISO-OSM 100MG/10ML
SYRINGE (ML) INJECTION AS NEEDED
Status: DISCONTINUED | OUTPATIENT
Start: 2023-11-13 | End: 2023-11-13

## 2023-11-13 RX ORDER — SODIUM CHLORIDE, SODIUM LACTATE, POTASSIUM CHLORIDE, CALCIUM CHLORIDE 600; 310; 30; 20 MG/100ML; MG/100ML; MG/100ML; MG/100ML
125 INJECTION, SOLUTION INTRAVENOUS CONTINUOUS
Status: DISCONTINUED | OUTPATIENT
Start: 2023-11-13 | End: 2023-11-13 | Stop reason: HOSPADM

## 2023-11-13 RX ORDER — ACETAMINOPHEN 325 MG/1
975 TABLET ORAL ONCE
Status: COMPLETED | OUTPATIENT
Start: 2023-11-13 | End: 2023-11-13

## 2023-11-13 RX ORDER — ONDANSETRON 2 MG/ML
INJECTION INTRAMUSCULAR; INTRAVENOUS AS NEEDED
Status: DISCONTINUED | OUTPATIENT
Start: 2023-11-13 | End: 2023-11-13

## 2023-11-13 RX ORDER — FENTANYL CITRATE 50 UG/ML
INJECTION, SOLUTION INTRAMUSCULAR; INTRAVENOUS AS NEEDED
Status: DISCONTINUED | OUTPATIENT
Start: 2023-11-13 | End: 2023-11-13

## 2023-11-13 RX ORDER — LIDOCAINE HYDROCHLORIDE 20 MG/ML
INJECTION, SOLUTION EPIDURAL; INFILTRATION; INTRACAUDAL; PERINEURAL AS NEEDED
Status: DISCONTINUED | OUTPATIENT
Start: 2023-11-13 | End: 2023-11-13

## 2023-11-13 RX ORDER — ACETAMINOPHEN 325 MG/1
975 TABLET ORAL EVERY 6 HOURS PRN
Status: DISCONTINUED | OUTPATIENT
Start: 2023-11-13 | End: 2023-11-13 | Stop reason: HOSPADM

## 2023-11-13 RX ORDER — EPHEDRINE SULFATE 50 MG/ML
INJECTION INTRAVENOUS AS NEEDED
Status: DISCONTINUED | OUTPATIENT
Start: 2023-11-13 | End: 2023-11-13

## 2023-11-13 RX ORDER — CEFAZOLIN SODIUM 1 G/50ML
1000 SOLUTION INTRAVENOUS ONCE
Status: COMPLETED | OUTPATIENT
Start: 2023-11-13 | End: 2023-11-13

## 2023-11-13 RX ORDER — FUROSEMIDE 10 MG/ML
INJECTION INTRAMUSCULAR; INTRAVENOUS AS NEEDED
Status: DISCONTINUED | OUTPATIENT
Start: 2023-11-13 | End: 2023-11-13

## 2023-11-13 RX ORDER — DEXAMETHASONE SODIUM PHOSPHATE 10 MG/ML
INJECTION, SOLUTION INTRAMUSCULAR; INTRAVENOUS AS NEEDED
Status: DISCONTINUED | OUTPATIENT
Start: 2023-11-13 | End: 2023-11-13

## 2023-11-13 RX ORDER — ONDANSETRON 2 MG/ML
4 INJECTION INTRAMUSCULAR; INTRAVENOUS EVERY 6 HOURS PRN
Status: DISCONTINUED | OUTPATIENT
Start: 2023-11-13 | End: 2023-11-13 | Stop reason: HOSPADM

## 2023-11-13 RX ORDER — PROPOFOL 10 MG/ML
INJECTION, EMULSION INTRAVENOUS CONTINUOUS PRN
Status: DISCONTINUED | OUTPATIENT
Start: 2023-11-13 | End: 2023-11-13

## 2023-11-13 RX ORDER — IBUPROFEN 200 MG
CAPSULE ORAL AS NEEDED
Status: DISCONTINUED | OUTPATIENT
Start: 2023-11-13 | End: 2023-11-13

## 2023-11-13 RX ORDER — HYDROMORPHONE HCL/PF 1 MG/ML
0.5 SYRINGE (ML) INJECTION
Status: DISCONTINUED | OUTPATIENT
Start: 2023-11-13 | End: 2023-11-13 | Stop reason: HOSPADM

## 2023-11-13 RX ORDER — IBUPROFEN 600 MG/1
600 TABLET ORAL EVERY 6 HOURS PRN
Status: DISCONTINUED | OUTPATIENT
Start: 2023-11-13 | End: 2023-11-13 | Stop reason: HOSPADM

## 2023-11-13 RX ORDER — ONDANSETRON 2 MG/ML
4 INJECTION INTRAMUSCULAR; INTRAVENOUS ONCE AS NEEDED
Status: DISCONTINUED | OUTPATIENT
Start: 2023-11-13 | End: 2023-11-13 | Stop reason: HOSPADM

## 2023-11-13 RX ADMIN — PROPOFOL 50 MCG/KG/MIN: 10 INJECTION, EMULSION INTRAVENOUS at 12:10

## 2023-11-13 RX ADMIN — LIDOCAINE HYDROCHLORIDE 20 MG: 20 INJECTION, SOLUTION EPIDURAL; INFILTRATION; INTRACAUDAL at 12:10

## 2023-11-13 RX ADMIN — DEXAMETHASONE SODIUM PHOSPHATE 5 MG: 10 INJECTION INTRAMUSCULAR; INTRAVENOUS at 12:16

## 2023-11-13 RX ADMIN — PROPOFOL 20 MG: 10 INJECTION, EMULSION INTRAVENOUS at 12:23

## 2023-11-13 RX ADMIN — Medication 10 MG: at 12:10

## 2023-11-13 RX ADMIN — Medication 10 MG: at 12:19

## 2023-11-13 RX ADMIN — FUROSEMIDE 10 MG: 10 INJECTION, SOLUTION INTRAVENOUS at 13:21

## 2023-11-13 RX ADMIN — BACITRACIN ZINC, NEOMYCIN, POLYMYXIN B 1 APPLICATION: 400; 3.5; 5 OINTMENT TOPICAL at 12:41

## 2023-11-13 RX ADMIN — ONDANSETRON 4 MG: 2 INJECTION INTRAMUSCULAR; INTRAVENOUS at 13:22

## 2023-11-13 RX ADMIN — PROPOFOL 40 MG: 10 INJECTION, EMULSION INTRAVENOUS at 12:11

## 2023-11-13 RX ADMIN — ACETAMINOPHEN 325MG 975 MG: 325 TABLET ORAL at 09:39

## 2023-11-13 RX ADMIN — EPHEDRINE SULFATE 15 MG: 50 INJECTION, SOLUTION INTRAVENOUS at 12:41

## 2023-11-13 RX ADMIN — FENTANYL CITRATE 50 MCG: 50 INJECTION INTRAMUSCULAR; INTRAVENOUS at 12:23

## 2023-11-13 RX ADMIN — SODIUM CHLORIDE, SODIUM LACTATE, POTASSIUM CHLORIDE, AND CALCIUM CHLORIDE 125 ML/HR: .6; .31; .03; .02 INJECTION, SOLUTION INTRAVENOUS at 15:31

## 2023-11-13 RX ADMIN — FENTANYL CITRATE 50 MCG: 50 INJECTION INTRAMUSCULAR; INTRAVENOUS at 12:05

## 2023-11-13 RX ADMIN — Medication 10 MG: at 12:14

## 2023-11-13 RX ADMIN — CEFAZOLIN SODIUM 1000 MG: 1 SOLUTION INTRAVENOUS at 12:05

## 2023-11-13 RX ADMIN — PROPOFOL 200 MG: 10 INJECTION, EMULSION INTRAVENOUS at 12:30

## 2023-11-13 RX ADMIN — Medication 10 MG: at 12:23

## 2023-11-13 RX ADMIN — Medication 10 MG: at 12:28

## 2023-11-13 RX ADMIN — SODIUM CHLORIDE, SODIUM LACTATE, POTASSIUM CHLORIDE, AND CALCIUM CHLORIDE 125 ML/HR: .6; .31; .03; .02 INJECTION, SOLUTION INTRAVENOUS at 10:00

## 2023-11-13 NOTE — ANESTHESIA PREPROCEDURE EVALUATION
Procedure:  COLPOSUSPENSION VAGINAL EXTRAPERITONEAL(ENPLACE) (Vagina )  A&P COLPORRHAPHY (Perineum)  PV SLING (Vagina )  CYSTO (Bladder)    Relevant Problems   CARDIO   (+) Hyperlipidemia   (+) Hypertension      Genitourinary   (+) Cystocele without uterine prolapse      Other   (+) Polymyalgia rheumatica (HCC)   (+) Wears partial dentures        Physical Exam    Airway    Mallampati score: II  TM Distance: >3 FB  Neck ROM: full     Dental    upper dentures    Cardiovascular  Rhythm: regular, Rate: normal    Pulmonary   Breath sounds clear to auscultation    Other Findings      Anesthesia Plan  ASA Score- 2     Anesthesia Type- IV sedation with anesthesia with ASA Monitors. Additional Monitors:     Airway Plan:     Comment: GA prn. Plan Factors-    Chart reviewed. EKG reviewed. Existing labs reviewed. Patient summary reviewed. Patient is not a current smoker. Patient not instructed to abstain from smoking on day of procedure. Patient did not smoke on day of surgery. There is medical exclusion for perioperative obstructive sleep apnea risk education. Induction- intravenous. Postoperative Plan-     Informed Consent- Anesthetic plan and risks discussed with patient.

## 2023-11-13 NOTE — ANESTHESIA POSTPROCEDURE EVALUATION
Post-Op Assessment Note    CV Status:  Stable  Pain Score: 0    Pain management: adequate     Mental Status:  Alert and awake   Hydration Status:  Euvolemic   PONV Controlled:  Controlled   Airway Patency:  Patent   Two or more mitigation strategies used for obstructive sleep apnea   Post Op Vitals Reviewed: Yes      Staff: Anesthesiologist, CRNA         No notable events documented.     BP      Temp     Pulse     Resp      SpO2      /69   Pulse 67   Temp (!) 96.8 °F (36 °C) (Temporal)   Resp 16   Wt 50.3 kg (110 lb 14.3 oz)   SpO2 94%   BMI 20.95 kg/m²

## (undated) DEVICE — TUBING SUCTION 5MM X 12 FT

## (undated) DEVICE — SYRINGE 3ML LL

## (undated) DEVICE — SPONGE CHERRY 1/2IN

## (undated) DEVICE — 2000CC GUARDIAN II: Brand: GUARDIAN

## (undated) DEVICE — SMOKE EVACUATION TUBING WITH 8 IN INTEGRAL WAND AND SPONGE GUARD: Brand: BUFFALO FILTER

## (undated) DEVICE — SUT PDS II 2-0 CT-2 27 IN Z333H

## (undated) DEVICE — PACKING VAGINAL 2 IN

## (undated) DEVICE — NEEDLE SPINAL 22G X 7IN QUINCKE

## (undated) DEVICE — DECANTER: Brand: UNBRANDED

## (undated) DEVICE — CATH FOLEY 18FR 5ML 2 WAY UNCOATED SILICONE

## (undated) DEVICE — BULB SYRINGE,IRRIGATION WITH PROTECTIVE CAP: Brand: DOVER

## (undated) DEVICE — EXIDINE 4 PCT

## (undated) DEVICE — CAUTERY TIP POLISHER: Brand: DEVON

## (undated) DEVICE — UNDER BUTTOCKS DRAPE W/FLUID CONTROL POUCH: Brand: CONVERTORS

## (undated) DEVICE — THE NEUGUIDE™ IS A SINGLE USE TRANS-VAGINAL DEVICE FOR THE REPAIR OF PELVIC ORGAN PROLAPSE (POP) BY ANCHORING SUTURES TO LIGAMENTS OF THE PELVIC FLOOR.
Type: IMPLANTABLE DEVICE | Site: VAGINA | Status: NON-FUNCTIONAL
Brand: NEUGUIDE™

## (undated) DEVICE — SCD SEQUENTIAL COMPRESSION COMFORT SLEEVE MEDIUM KNEE LENGTH: Brand: KENDALL SCD

## (undated) DEVICE — SUT VICRYL 2-0 SH 27 IN UNDYED J417H

## (undated) DEVICE — ALLENTOWN DR  LUCENTE S LAP PK: Brand: CARDINAL HEALTH

## (undated) DEVICE — GLOVE PI ULTRA TOUCH SZ.8.0

## (undated) DEVICE — PREMIUM DRY TRAY LF: Brand: MEDLINE INDUSTRIES, INC.

## (undated) DEVICE — INTENDED FOR TISSUE SEPARATION, AND OTHER PROCEDURES THAT REQUIRE A SHARP SURGICAL BLADE TO PUNCTURE OR CUT.: Brand: BARD-PARKER SAFETY BLADES SIZE 11, STERILE

## (undated) DEVICE — INTENT TO BE USED WITH SUTURE MATERIAL FOR TISSUE CLOSURE: Brand: RICHARD-ALLAN® NEEDLE 1/2 CIRCLE TAPER

## (undated) DEVICE — ELECTROSURGICAL DEVICE HOLSTER;FOR USE WITH MAXIMUM PEAK VOLTAGE OF 4000 V: Brand: FORCE TRIVERSE

## (undated) DEVICE — MEDI-VAC YANKAUER SUCTION HANDLE W/BULBOUS AND CONTROL VENT: Brand: CARDINAL HEALTH

## (undated) DEVICE — NEEDLE HYPO 22G X 1-1/2 IN